# Patient Record
Sex: MALE | Race: WHITE | URBAN - METROPOLITAN AREA
[De-identification: names, ages, dates, MRNs, and addresses within clinical notes are randomized per-mention and may not be internally consistent; named-entity substitution may affect disease eponyms.]

---

## 2017-05-17 ENCOUNTER — TEAM CONFERENCE (OUTPATIENT)
Dept: SLEEP MEDICINE | Facility: CLINIC | Age: 82
End: 2017-05-17

## 2017-05-24 DIAGNOSIS — G47.33 OSA (OBSTRUCTIVE SLEEP APNEA): Primary | ICD-10-CM

## 2017-05-24 DIAGNOSIS — G47.50 PARASOMNIA: ICD-10-CM

## 2017-05-24 NOTE — LETTER
May 26, 2017    Dear Kristin Garcia Khalil, Timothy, Candie, and Stephanie Segal:  Your patient: Nicole Monge   : 1932  Last four SSN: [id number]  Is scheduled for a sleep study at [facility]   Study Date: 2017  Please make sure that the patient brings a sleep aid if you think that they may need one for the study.  Final results will be faxed to: [phone]   Patient will also have a copy of the final results mailed to them.  Final results will not be discussed with patient if no follow up in our center is requested.  Patient is instructed to follow up with you for final result and further recommendations/orders, etc.  Please contact our  staff directly with questions/concerns.  Phone [phone], Fax [phone]  Thank you for the referral.  Sincerely,   Marly Conway MD  Medical Director [place] [facility]  Que Treviño MD  Medical Director [place] [facility]

## 2017-05-24 NOTE — PROGRESS NOTES
86 y/o male with nightmares* [combat experiences in [place]] over 2-3 years after death of wife. Dreams with rapid heart, prolonged awakening.    PMH  -Ischemic cardiomyopathy ER 35-40%  - Hypertension  -Atrial fib  -Stroke, impaired gait    Orders placed for sleep study: parasomnia, cheyne calloway, KARLA  If KARLA > 20--EPAP  Would use oxygen if hypoxic due to cheyne calloway on EPAP    * on metoprolol

## 2017-05-26 NOTE — NURSING NOTE
Scheduling letter sent/faxed to [facility] to confirm that pt has been scheduled for sleep study. [facility] for [facility] and plan of care.       TALITA Frey  Clinic Coordinator   Registered Medical Assistant   [facility]

## 2017-06-14 ENCOUNTER — THERAPY VISIT (OUTPATIENT)
Dept: SLEEP MEDICINE | Facility: CLINIC | Age: 82
End: 2017-06-14
Attending: INTERNAL MEDICINE
Payer: COMMERCIAL

## 2017-06-14 DIAGNOSIS — G47.50 PARASOMNIA: ICD-10-CM

## 2017-06-14 DIAGNOSIS — G47.33 OSA (OBSTRUCTIVE SLEEP APNEA): ICD-10-CM

## 2017-06-14 PROCEDURE — 95810 POLYSOM 6/> YRS 4/> PARAM: CPT | Mod: ZF

## 2017-06-14 NOTE — MR AVS SNAPSHOT
After Visit Summary   6/14/2017    Nicole Monge    MRN: [id number]           Patient Information     Date Of Birth          2/5/1932        Visit Information        Provider Department      6/14/2017 8:00 PM SLEEP STUDY RM 6 [facility], Sleep Study        Today's Diagnoses     Parasomnia        KARLA (obstructive sleep apnea)          Care Instructions    [place] [facility]    1. Your sleep study will be reviewed by a sleep physician within the next few days.     2. Please follow up in the sleep clinic as scheduled, or, make an appointment with your sleep provider to be seen within two weeks to discuss the results of the sleep study.    3. If you have any questions or problems with your treatment plan, please contact your sleep clinic provider at [phone] to further manage your condition.    4. Please review your attached medication list, and, at your follow-up appointment advise your sleep clinic provider about any changes.    5. Go to [email or web address] for more information about your sleep problems.    CAIO Leon  June 14, 2017                Follow-ups after your visit        Who to contact     If you have questions or need follow up information about today's clinic visit or your schedule please contact [facility], SLEEP STUDY directly at [phone].  Normal or non-critical lab and imaging results will be communicated to you by MyChart, letter or phone within 4 business days after the clinic has received the results. If you do not hear from us within 7 days, please contact the clinic through MyChart or phone. If you have a critical or abnormal lab result, we will notify you by phone as soon as possible.  Submit refill requests through ivWatch or call your pharmacy and they will forward the refill request to us. Please allow 3 business days for your refill to be completed.          Additional Information About Your Visit        MyChart Information   "   Rawbots lets you send messages to your doctor, view your test results, renew your prescriptions, schedule appointments and more. To sign up, go to [email or web address] . Click on \"Log in\" on the left side of the screen, which will take you to the Welcome page. Then click on \"Sign up Now\" on the right side of the page.     You will be asked to enter the access code listed below, as well as some personal information. Please follow the directions to create your username and password.     Your access code is: [email or web address]  Expires: 2017  8:11 PM     Your access code will  in 90 days. If you need help or a new code, please call your [place] clinic or [phone].        Care EveryWhere ID     This is your Care EveryWhere ID. This could be used by other organizations to access your [place] medical records  [id number]         Blood Pressure from Last 3 Encounters:   No data found for BP    Weight from Last 3 Encounters:   No data found for Wt              We Performed the Following     Comprehensive Sleep Study        Primary Care Provider    None Specified       No primary provider on file.        Thank you!     Thank you for choosing [facility], SLEEP STUDY  for your care. Our goal is always to provide you with excellent care. Hearing back from our patients is one way we can continue to improve our services. Please take a few minutes to complete the written survey that you may receive in the mail after your visit with us. Thank you!             Your Updated Medication List - Protect others around you: Learn how to safely use, store and throw away your medicines at www.disposemymeds.org.          This list is accurate as of: 17  8:12 PM.  Always use your most recent med list.                   Brand Name Dispense Instructions for use    APIXABAN PO          METOPROLOL SUCCINATE ER PO          SIMVASTATIN PO          VALSARTAN PO          VITAMIN D (CHOLECALCIFEROL) PO      Take by mouth " daily

## 2017-06-15 NOTE — PATIENT INSTRUCTIONS
[place] [facility]    1. Your sleep study will be reviewed by a sleep physician within the next few days.     2. Please follow up in the sleep clinic as scheduled, or, make an appointment with your sleep provider to be seen within two weeks to discuss the results of the sleep study.    3. If you have any questions or problems with your treatment plan, please contact your sleep clinic provider at [phone] to further manage your condition.    4. Please review your attached medication list, and, at your follow-up appointment advise your sleep clinic provider about any changes.    5. Go to [email or web address] for more information about your sleep problems.    Skinny Mullen, ORIONGT  June 14, 2017

## 2019-01-01 ENCOUNTER — DOCUMENTATION ONLY (OUTPATIENT)
Facility: CLINIC | Age: 84
End: 2019-01-01

## 2019-01-01 ENCOUNTER — DOCUMENTATION ONLY (OUTPATIENT)
Dept: OTHER | Facility: CLINIC | Age: 84
End: 2019-01-01

## 2019-02-25 NOTE — PROGRESS NOTES
Hospice physician visit  Location: Skilled nursing facility  Reason for visit: Assess symptoms  HPI: Patient is an 87-year-old man admitted to hospice after hospitalizations in December for pneumonia and sepsis complicated by cardiogenic shock secondary to NSTEMI and acute kidney injury with a creatinine of 6.4 BUN of 104 GFR of 8 on 12-26-18.  At the time of hospitalization he was noted to have an ejection fraction of less than 20% post MI.  After this he was extremely weak, needed assistance to reposition himself in bed, using a Markus to transfer him to a Broda chair with inability to ambulate or care for self.  At that time he needed total assistance with dressing, bathing, transfers.  He had significant weight loss prior to this, reported by patient to be more than 30 pounds.  He has been living at the care center since October due to more difficulty taking care of himself at home.  Since admitting into hospice on 1/1/19 he has had significant improvement.  He has gotten stronger and cannot ambulate for short distances pushing his Broda chair.  He is able to sit up in bed and reposition himself without difficulty.  He is feeding himself has a good appetite and is gaining weight appropriately.  He denies any chest pain, shortness of breath both at rest and with exertion.  He has no ankle edema that requires management.  He wants to revoke hospice and seek physical therapy in hopes of being able to go back to his own home.  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  PSH: As above.  Social history: Has 2 grown daughters, they are his current decision makers when he is not competent.  He is not happy with this arrangement.  Family history: Noncontributory 12 point review  systems negative except as per HPI.  Patient specifically denies any chest pain or shortness of breath, urinating normally, no constipation.  Objective  Awake, alert, oriented to person and place.  Sitting up in a Broda chair and able to stand without assistance.  Eyes: Nonicteric.  Oropharynx: Moist.  Neck: Supple, no LAD, thyromegaly, mass.  Chest: CTA B.  Heart: RRR, no M.  Abdomen: Soft, nontender nondistended, bowel sounds present.  Extremities: Trace pedal edema.  Psych.  Alert and oriented, very conversational, accurate about information in his medical history.  Does not have much knowledge of what happened during his last hospital stay.  This could be due to his severe illness.  Assessment: 87-year-old with acute kidney injury not improving in the hospital status post cardiogenic shock with MI, EF of less than 20%.  At the time of admission to hospice he was doing very poorly, it was estimated he only had weeks to a month to live.  He has done very well, made significant progress in terms of level of function, cognition, and nutrition.  He wishes to revoke hospice to pursue physical therapy.  Plan: Discussed with the patient that he can revoke hospice to pursue physical therapy.  He will need to talk to his current physicians about orders for physical therapy and plans he is making for leaving the facility.  We also discussed his healthcare directive and that he can name a different agent if he does not want his daughters to make his decisions.  I do believe that he is competent at this time.  Annabelle Peacock MD